# Patient Record
Sex: FEMALE | Race: WHITE
[De-identification: names, ages, dates, MRNs, and addresses within clinical notes are randomized per-mention and may not be internally consistent; named-entity substitution may affect disease eponyms.]

---

## 2017-07-28 ENCOUNTER — HOSPITAL ENCOUNTER (EMERGENCY)
Dept: HOSPITAL 10 - FTE | Age: 37
Discharge: HOME | End: 2017-07-28
Payer: COMMERCIAL

## 2017-07-28 VITALS — BODY MASS INDEX: 40.31 KG/M2 | HEIGHT: 55 IN | WEIGHT: 174.17 LBS

## 2017-07-28 DIAGNOSIS — R21: Primary | ICD-10-CM

## 2017-07-28 DIAGNOSIS — L53.9: ICD-10-CM

## 2017-07-28 PROCEDURE — 96372 THER/PROPH/DIAG INJ SC/IM: CPT

## 2017-07-28 NOTE — ERD
ER Documentation


Chief Complaint


Date/Time


DATE: 7/28/17 


TIME: 13:08


Chief Complaint


ITCHY FOLLOWING BP MEDS





HPI


36-year-old female presents to the emergency department with diffuse itching 

and erythema that started an hour after taking her blood pressure medication 

this morning.  Patient states that she takes medication for blood pressure 

usually at nighttime, she has been taking this for 4 years but does not recall 

the name at this time.  She went to work and she is out like her blood pressure 

is high she felt hot, and then she took a blood pressure medication an hour 

later she got a rash.  She does not recall taking new foods, medications or 

lotions or creams.  She has no known allergies.  She has not had any trouble 

swallowing, shortness of breath.





ROS


All systems reviewed and are negative except as per history of present illness.





Medications


Home Meds


Active Scripts


Levofloxacin* (Levaquin*) 750 Mg Tablet, 750 MG PO DAILY for 7 Days, TAB


   Prov:ISAÍAS MARIE         4/13/16


Clonidine Hcl* (Clonidine Hcl*) 0.2 Mg Tablet, 0.2 MG PO BID, #20 TAB


   Prov:HITESH DAVIS MD         1/24/16


Reported Medications


Famotidine* (Famotidine*) 20 Mg Tablet, 20 MG PO BID, TAB


   9/24/15





Allergies


Allergies:  


Coded Allergies:  


     No Known Drug Allergy (Verified  Allergy, Mild, 9/24/15)





PMhx/Soc


History of Surgery:  Yes (1X C SECTION, GALLBLADDER (LASER) SURGERY, HERNIA 

SURGERY)


Anesthesia Reaction:  No


Hx Neurological Disorder:  No


Hx Respiratory Disorders:  No


Hx Cardiac Disorders:  No


Hx Psychiatric Problems:  No


Hx Miscellaneous Medical Probl:  No


Hx Alcohol Use:  No


Hx Substance Use:  No


Hx Tobacco Use:  No





Physical Exam


Vitals





Vital Signs








  Date Time  Temp Pulse Resp B/P Pulse Ox O2 Delivery O2 Flow Rate FiO2


 


7/28/17 12:02 98.0 94 18 177/75 99   








Physical Exam


General: Well-developed, well-nourished.  The patient appears in no acute 

distress.


HEENT: Head is normocephalic, atraumatic. No scleral icterus.  


Neck: Supple.  Nontender.


Lungs: Clear to auscultation.  Normal air movement.


Heart: Regular rate and rhythm.  S1 and S2 are normal.  No murmurs, gallops, or 

rubs.


Abdomen: Soft, nontender, nondistended.  Bowel sounds are normoactive.


Extremities: No clubbing or cyanosis.  Normal pulses. Moving extremities x 4. 

No weakness.


Neurologic: Alert and oriented 3.  No focal deficits.


Skin: Blotchy, erythematous rash, patient's face, extremities and trunk are 

involved.


Results 24 hrs





 Current Medications








 Medications


  (Trade)  Dose


 Ordered  Sig/Edmund


 Route


 PRN Reason  Start Time


 Stop Time Status Last Admin


Dose Admin


 


 Diphenhydramine


 HCl


  (Benadryl)  50 mg  ONCE  ONCE


 IM


   7/28/17 13:00


 7/28/17 13:01 DC 7/28/17 12:53


 


 


 Methylprednisolone


 Sodium Succinate


  (Solu-Medrol)  125 mg  ONCE  ONCE


 IM


   7/28/17 13:00


 7/28/17 13:01 DC 7/28/17 12:53


 











Procedures/MDM


ED course:


Patient was given Solu-Medrol 125 mg IM, as well as Benadryl 50 mg IM.





MDM: 36-year-old female comes emergency room with allergic reaction, rash is 

erythematous and itchy.  It is unlikely due to her blood pressure medication as 

she has been taking this for 4 years.  She does not recall the name at this 

time.  She likely was feeling this way because of the reaction, she felt like 

she was getting hot.  Patient's allergic symptoms have stabilized while they 

have been evaluated in the department without evidence of persistent systemic 

reaction. 


Patient is healthy and capable of treating and responding to rebound reactions.


Patient appropriate for outpatient allergy work up and treatment.





Departure


Diagnosis:  


 Primary Impression:  


 Allergic reaction


Condition:  ROBIN Dao PA-C Jul 28, 2017 13:12

## 2018-07-20 ENCOUNTER — HOSPITAL ENCOUNTER (EMERGENCY)
Age: 38
Discharge: HOME | End: 2018-07-20

## 2018-07-20 ENCOUNTER — HOSPITAL ENCOUNTER (EMERGENCY)
Dept: HOSPITAL 91 - E/R | Age: 38
Discharge: HOME | End: 2018-07-20
Payer: COMMERCIAL

## 2018-07-20 DIAGNOSIS — F41.9: Primary | ICD-10-CM

## 2018-07-20 DIAGNOSIS — R40.2142: ICD-10-CM

## 2018-07-20 DIAGNOSIS — I10: ICD-10-CM

## 2018-07-20 DIAGNOSIS — N39.0: ICD-10-CM

## 2018-07-20 DIAGNOSIS — R40.2252: ICD-10-CM

## 2018-07-20 DIAGNOSIS — R40.2362: ICD-10-CM

## 2018-07-20 LAB
ADD MAN DIFF?: NO
ADD UMIC: YES
ALANINE AMINOTRANSFERASE: 44 IU/L (ref 13–69)
ALBUMIN/GLOBULIN RATIO: 1.26
ALBUMIN: 4.3 G/DL (ref 3.3–4.9)
ALKALINE PHOSPHATASE: 73 IU/L (ref 42–121)
ANION GAP: 14 (ref 8–16)
ASPARTATE AMINO TRANSFERASE: 36 IU/L (ref 15–46)
BASOPHIL #: 0 10^3/UL (ref 0–0.1)
BASOPHILS %: 0.3 % (ref 0–2)
BILIRUBIN,DIRECT: 0 MG/DL (ref 0–0.2)
BILIRUBIN,TOTAL: 0.6 MG/DL (ref 0.2–1.3)
BLOOD UREA NITROGEN: 10 MG/DL (ref 7–20)
CALCIUM: 9.5 MG/DL (ref 8.4–10.2)
CARBON DIOXIDE: 19 MMOL/L (ref 21–31)
CHLORIDE: 111 MMOL/L (ref 97–110)
CREATININE: 0.57 MG/DL (ref 0.44–1)
EOSINOPHILS #: 0 10^3/UL (ref 0–0.5)
EOSINOPHILS %: 0.3 % (ref 0–7)
GLOBULIN: 3.4 G/DL (ref 1.3–3.2)
GLUCOSE: 119 MG/DL (ref 70–220)
HEMATOCRIT: 36.7 % (ref 37–47)
HEMOGLOBIN: 12.4 G/DL (ref 12–16)
LIPASE: 28 U/L (ref 23–300)
LYMPHOCYTES #: 2 10^3/UL (ref 0.8–2.9)
LYMPHOCYTES %: 22.8 % (ref 15–51)
MEAN CORPUSCULAR HEMOGLOBIN: 28.6 PG (ref 29–33)
MEAN CORPUSCULAR HGB CONC: 33.8 G/DL (ref 32–37)
MEAN CORPUSCULAR VOLUME: 84.8 FL (ref 82–101)
MEAN PLATELET VOLUME: 10.3 FL (ref 7.4–10.4)
MONOCYTE #: 0.7 10^3/UL (ref 0.3–0.9)
MONOCYTES %: 8 % (ref 0–11)
NEUTROPHIL #: 6.1 10^3/UL (ref 1.6–7.5)
NEUTROPHILS %: 68.5 % (ref 39–77)
NUCLEATED RED BLOOD CELLS #: 0 10^3/UL (ref 0–0)
NUCLEATED RED BLOOD CELLS%: 0 /100WBC (ref 0–0)
PLATELET COUNT: 297 10^3/UL (ref 140–415)
POTASSIUM: 3.8 MMOL/L (ref 3.5–5.1)
RED BLOOD COUNT: 4.33 10^6/UL (ref 4.2–5.4)
RED CELL DISTRIBUTION WIDTH: 13.6 % (ref 11.5–14.5)
SODIUM: 140 MMOL/L (ref 135–144)
TOTAL PROTEIN: 7.7 G/DL (ref 6.1–8.1)
UR ASCORBIC ACID: NEGATIVE MG/DL
UR BACTERIA: (no result) /HPF
UR BILIRUBIN (DIP): NEGATIVE MG/DL
UR BLOOD (DIP): NEGATIVE MG/DL
UR CLARITY: CLEAR
UR COLOR: (no result)
UR GLUCOSE (DIP): NEGATIVE MG/DL
UR KETONES (DIP): (no result) MG/DL
UR LEUKOCYTE ESTERASE (DIP): (no result) LEU/UL
UR NITRITE (DIP): NEGATIVE MG/DL
UR PH (DIP): 8 (ref 5–9)
UR RBC: 1 /HPF (ref 0–5)
UR SPECIFIC GRAVITY (DIP): 1 (ref 1–1.03)
UR TOTAL PROTEIN (DIP): NEGATIVE MG/DL
UR UROBILINOGEN (DIP): NEGATIVE MG/DL
UR WBC: 14 /HPF (ref 0–5)
WHITE BLOOD COUNT: 9 10^3/UL (ref 4.8–10.8)

## 2018-07-20 PROCEDURE — 81001 URINALYSIS AUTO W/SCOPE: CPT

## 2018-07-20 PROCEDURE — 36415 COLL VENOUS BLD VENIPUNCTURE: CPT

## 2018-07-20 PROCEDURE — 96374 THER/PROPH/DIAG INJ IV PUSH: CPT

## 2018-07-20 PROCEDURE — 85025 COMPLETE CBC W/AUTO DIFF WBC: CPT

## 2018-07-20 PROCEDURE — 81025 URINE PREGNANCY TEST: CPT

## 2018-07-20 PROCEDURE — 80053 COMPREHEN METABOLIC PANEL: CPT

## 2018-07-20 PROCEDURE — 83690 ASSAY OF LIPASE: CPT

## 2018-07-20 PROCEDURE — 96361 HYDRATE IV INFUSION ADD-ON: CPT

## 2018-07-20 PROCEDURE — 99284 EMERGENCY DEPT VISIT MOD MDM: CPT

## 2018-07-20 RX ADMIN — LORAZEPAM 1 MG: 2 INJECTION, SOLUTION INTRAMUSCULAR; INTRAVENOUS at 09:08

## 2018-07-20 RX ADMIN — KETOROLAC TROMETHAMINE 1 MG: 15 INJECTION, SOLUTION INTRAMUSCULAR; INTRAVENOUS at 09:08

## 2018-07-20 RX ADMIN — CEPHALEXIN 1 MG: 500 CAPSULE ORAL at 10:24

## 2018-07-20 RX ADMIN — THIAMINE HYDROCHLORIDE 1 MLS/HR: 100 INJECTION, SOLUTION INTRAMUSCULAR; INTRAVENOUS at 09:07

## 2018-12-14 ENCOUNTER — HOSPITAL ENCOUNTER (EMERGENCY)
Dept: HOSPITAL 91 - E/R | Age: 38
Discharge: HOME | End: 2018-12-14
Payer: COMMERCIAL

## 2018-12-14 ENCOUNTER — HOSPITAL ENCOUNTER (EMERGENCY)
Age: 38
Discharge: HOME | End: 2018-12-14

## 2018-12-14 DIAGNOSIS — I10: ICD-10-CM

## 2018-12-14 DIAGNOSIS — G89.18: Primary | ICD-10-CM

## 2018-12-14 LAB
ADD MAN DIFF?: NO
ADD UMIC: YES
ALANINE AMINOTRANSFERASE: 59 IU/L (ref 13–69)
ALBUMIN/GLOBULIN RATIO: 1.2
ALBUMIN: 4.2 G/DL (ref 3.3–4.9)
ALKALINE PHOSPHATASE: 79 IU/L (ref 42–121)
ANION GAP: 13 (ref 5–13)
ASPARTATE AMINO TRANSFERASE: 45 IU/L (ref 15–46)
BASOPHIL #: 0 10^3/UL (ref 0–0.1)
BASOPHILS %: 0.3 % (ref 0–2)
BILIRUBIN,DIRECT: 0 MG/DL (ref 0–0.2)
BILIRUBIN,TOTAL: 0.5 MG/DL (ref 0.2–1.3)
BLOOD UREA NITROGEN: 8 MG/DL (ref 7–20)
CALCIUM: 9.7 MG/DL (ref 8.4–10.2)
CARBON DIOXIDE: 29 MMOL/L (ref 21–31)
CHLORIDE: 102 MMOL/L (ref 97–110)
CREATININE: 0.58 MG/DL (ref 0.44–1)
EOSINOPHILS #: 0.1 10^3/UL (ref 0–0.5)
EOSINOPHILS %: 0.9 % (ref 0–7)
GLOBULIN: 3.5 G/DL (ref 1.3–3.2)
GLUCOSE: 102 MG/DL (ref 70–220)
HEMATOCRIT: 39.2 % (ref 37–47)
HEMOGLOBIN: 13.1 G/DL (ref 12–16)
IMMATURE GRANS #M: 0.01 10^3/UL (ref 0–0.03)
IMMATURE GRANS % (M): 0.1 % (ref 0–0.43)
LIPASE: 36 U/L (ref 23–300)
LYMPHOCYTES #: 2.5 10^3/UL (ref 0.8–2.9)
LYMPHOCYTES %: 28.4 % (ref 15–51)
MEAN CORPUSCULAR HEMOGLOBIN: 28.5 PG (ref 29–33)
MEAN CORPUSCULAR HGB CONC: 33.4 G/DL (ref 32–37)
MEAN CORPUSCULAR VOLUME: 85.4 FL (ref 82–101)
MEAN PLATELET VOLUME: 10.5 FL (ref 7.4–10.4)
MONOCYTE #: 0.8 10^3/UL (ref 0.3–0.9)
MONOCYTES %: 9 % (ref 0–11)
NEUTROPHIL #: 5.4 10^3/UL (ref 1.6–7.5)
NEUTROPHILS %: 61.3 % (ref 39–77)
NUCLEATED RED BLOOD CELLS #: 0 10^3/UL (ref 0–0)
NUCLEATED RED BLOOD CELLS%: 0 /100WBC (ref 0–0)
PLATELET COUNT: 316 10^3/UL (ref 140–415)
POTASSIUM: 3.7 MMOL/L (ref 3.5–5.1)
RED BLOOD COUNT: 4.59 10^6/UL (ref 4.2–5.4)
RED CELL DISTRIBUTION WIDTH: 12.4 % (ref 11.5–14.5)
SODIUM: 144 MMOL/L (ref 135–144)
TOTAL PROTEIN: 7.7 G/DL (ref 6.1–8.1)
UR ASCORBIC ACID: NEGATIVE MG/DL
UR BILIRUBIN (DIP): NEGATIVE MG/DL
UR BLOOD (DIP): (no result) MG/DL
UR CLARITY: CLEAR
UR COLOR: COLORLESS
UR GLUCOSE (DIP): NEGATIVE MG/DL
UR KETONES (DIP): NEGATIVE MG/DL
UR LEUKOCYTE ESTERASE (DIP): NEGATIVE LEU/UL
UR NITRITE (DIP): NEGATIVE MG/DL
UR PH (DIP): 6 (ref 5–9)
UR RBC: 0 /HPF (ref 0–5)
UR SPECIFIC GRAVITY (DIP): 1 (ref 1–1.03)
UR TOTAL PROTEIN (DIP): NEGATIVE MG/DL
UR UROBILINOGEN (DIP): NEGATIVE MG/DL
UR WBC: 0 /HPF (ref 0–5)
WHITE BLOOD COUNT: 8.9 10^3/UL (ref 4.8–10.8)

## 2018-12-14 PROCEDURE — 83690 ASSAY OF LIPASE: CPT

## 2018-12-14 PROCEDURE — 96375 TX/PRO/DX INJ NEW DRUG ADDON: CPT

## 2018-12-14 PROCEDURE — 81001 URINALYSIS AUTO W/SCOPE: CPT

## 2018-12-14 PROCEDURE — 36415 COLL VENOUS BLD VENIPUNCTURE: CPT

## 2018-12-14 PROCEDURE — 85025 COMPLETE CBC W/AUTO DIFF WBC: CPT

## 2018-12-14 PROCEDURE — 74176 CT ABD & PELVIS W/O CONTRAST: CPT

## 2018-12-14 PROCEDURE — 96374 THER/PROPH/DIAG INJ IV PUSH: CPT

## 2018-12-14 PROCEDURE — 80053 COMPREHEN METABOLIC PANEL: CPT

## 2018-12-14 PROCEDURE — 99285 EMERGENCY DEPT VISIT HI MDM: CPT

## 2018-12-14 RX ADMIN — ONDANSETRON HYDROCHLORIDE 1 MG: 2 INJECTION, SOLUTION INTRAMUSCULAR; INTRAVENOUS at 06:46

## 2019-04-03 ENCOUNTER — HOSPITAL ENCOUNTER (EMERGENCY)
Dept: HOSPITAL 10 - FTE | Age: 39
Discharge: HOME | End: 2019-04-03
Payer: COMMERCIAL

## 2019-04-03 ENCOUNTER — HOSPITAL ENCOUNTER (EMERGENCY)
Dept: HOSPITAL 91 - FTE | Age: 39
Discharge: HOME | End: 2019-04-03
Payer: COMMERCIAL

## 2019-04-03 VITALS
BODY MASS INDEX: 31.72 KG/M2 | BODY MASS INDEX: 31.72 KG/M2 | WEIGHT: 179.02 LBS | WEIGHT: 179.02 LBS | HEIGHT: 63 IN | HEIGHT: 63 IN

## 2019-04-03 VITALS — SYSTOLIC BLOOD PRESSURE: 149 MMHG | HEART RATE: 79 BPM | DIASTOLIC BLOOD PRESSURE: 93 MMHG

## 2019-04-03 VITALS — RESPIRATION RATE: 18 BRPM

## 2019-04-03 DIAGNOSIS — I10: ICD-10-CM

## 2019-04-03 DIAGNOSIS — N39.0: Primary | ICD-10-CM

## 2019-04-03 LAB
ADD MAN DIFF?: NO
ADD UMIC: YES
ALANINE AMINOTRANSFERASE: 65 IU/L (ref 13–69)
ALBUMIN/GLOBULIN RATIO: 1.22
ALBUMIN: 4.3 G/DL (ref 3.3–4.9)
ALKALINE PHOSPHATASE: 90 IU/L (ref 42–121)
ANION GAP: 10 (ref 5–13)
ASPARTATE AMINO TRANSFERASE: 53 IU/L (ref 15–46)
BASOPHIL #: 0 10^3/UL (ref 0–0.1)
BASOPHILS %: 0.5 % (ref 0–2)
BILIRUBIN,DIRECT: 0 MG/DL (ref 0–0.2)
BILIRUBIN,TOTAL: 0.1 MG/DL (ref 0.2–1.3)
BLOOD UREA NITROGEN: 11 MG/DL (ref 7–20)
CALCIUM: 9.4 MG/DL (ref 8.4–10.2)
CARBON DIOXIDE: 28 MMOL/L (ref 21–31)
CHLORIDE: 102 MMOL/L (ref 97–110)
CREATININE: 0.61 MG/DL (ref 0.44–1)
EOSINOPHILS #: 0.1 10^3/UL (ref 0–0.5)
EOSINOPHILS %: 1.1 % (ref 0–7)
GLOBULIN: 3.5 G/DL (ref 1.3–3.2)
GLUCOSE: 101 MG/DL (ref 70–220)
HEMATOCRIT: 36.9 % (ref 37–47)
HEMOGLOBIN: 12 G/DL (ref 12–16)
IMMATURE GRANS #M: 0.03 10^3/UL (ref 0–0.03)
IMMATURE GRANS % (M): 0.4 % (ref 0–0.43)
LIPASE: 30 U/L (ref 23–300)
LYMPHOCYTES #: 2.3 10^3/UL (ref 0.8–2.9)
LYMPHOCYTES %: 28.4 % (ref 15–51)
MEAN CORPUSCULAR HEMOGLOBIN: 28.4 PG (ref 29–33)
MEAN CORPUSCULAR HGB CONC: 32.5 G/DL (ref 32–37)
MEAN CORPUSCULAR VOLUME: 87.2 FL (ref 82–101)
MEAN PLATELET VOLUME: 10 FL (ref 7.4–10.4)
MONOCYTE #: 0.9 10^3/UL (ref 0.3–0.9)
MONOCYTES %: 11.2 % (ref 0–11)
NEUTROPHIL #: 4.8 10^3/UL (ref 1.6–7.5)
NEUTROPHILS %: 58.4 % (ref 39–77)
NUCLEATED RED BLOOD CELLS #: 0 10^3/UL (ref 0–0)
NUCLEATED RED BLOOD CELLS%: 0 /100WBC (ref 0–0)
PLATELET COUNT: 288 10^3/UL (ref 140–415)
POTASSIUM: 4.3 MMOL/L (ref 3.5–5.1)
RED BLOOD COUNT: 4.23 10^6/UL (ref 4.2–5.4)
RED CELL DISTRIBUTION WIDTH: 12.7 % (ref 11.5–14.5)
SODIUM: 140 MMOL/L (ref 135–144)
TOTAL PROTEIN: 7.8 G/DL (ref 6.1–8.1)
UR ASCORBIC ACID: NEGATIVE MG/DL
UR BILIRUBIN (DIP): NEGATIVE MG/DL
UR BLOOD (DIP): NEGATIVE MG/DL
UR CLARITY: (no result)
UR COLOR: (no result)
UR GLUCOSE (DIP): NEGATIVE MG/DL
UR KETONES (DIP): NEGATIVE MG/DL
UR LEUKOCYTE ESTERASE (DIP): (no result) LEU/UL
UR NITRITE (DIP): NEGATIVE MG/DL
UR PH (DIP): 7 (ref 5–9)
UR RBC: 2 /HPF (ref 0–5)
UR SPECIFIC GRAVITY (DIP): 1.01 (ref 1–1.03)
UR SQUAMOUS EPITHELIAL CELL: (no result) /HPF
UR TOTAL PROTEIN (DIP): NEGATIVE MG/DL
UR UROBILINOGEN (DIP): NEGATIVE MG/DL
UR WBC: 4 /HPF (ref 0–5)
WHITE BLOOD COUNT: 8.1 10^3/UL (ref 4.8–10.8)

## 2019-04-03 PROCEDURE — 76705 ECHO EXAM OF ABDOMEN: CPT

## 2019-04-03 PROCEDURE — 83690 ASSAY OF LIPASE: CPT

## 2019-04-03 PROCEDURE — 81001 URINALYSIS AUTO W/SCOPE: CPT

## 2019-04-03 PROCEDURE — 36415 COLL VENOUS BLD VENIPUNCTURE: CPT

## 2019-04-03 PROCEDURE — 85025 COMPLETE CBC W/AUTO DIFF WBC: CPT

## 2019-04-03 PROCEDURE — 87086 URINE CULTURE/COLONY COUNT: CPT

## 2019-04-03 PROCEDURE — 99284 EMERGENCY DEPT VISIT MOD MDM: CPT

## 2019-04-03 PROCEDURE — 80053 COMPREHEN METABOLIC PANEL: CPT

## 2019-04-03 PROCEDURE — 84703 CHORIONIC GONADOTROPIN ASSAY: CPT

## 2019-04-03 RX ADMIN — ALUMINUM HYDROXIDE, MAGNESIUM HYDROXIDE, DIMETHICONE 1 ML: 200; 200; 20 SUSPENSION ORAL at 16:56

## 2019-04-03 RX ADMIN — FAMOTIDINE 1 MG: 20 TABLET ORAL at 16:56

## 2019-04-03 NOTE — ERD
ER Documentation


Chief Complaint


Chief Complaint





AP RADIATING TO LOWER BACK





HPI


This is a 38-year-old female with a history of heartburn and hypertension who 


presents ED with complaints of epigastric abdominal pain that started yesterday 


while eating spicy food.  Patient states that the pain is constant and she rates


it at a 7 out of 10 pain.  Patient states that the pain is alleviated when she 


is sitting still and worse with movement.  Patient has a history of having her 


gallbladder removed 19 years ago.  Patient states that she had a stent placed on


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  


Patient states that she has had similar epigastric pain in the past.  Patient 


was seen at [facility] on 2019 and had a full work-up and 


everything was unremarkable.  Denies fever, chills, nausea, vomiting, diarrhea, 


constipation, hematemesis, hemoptysis, cough, congestion, chest pain, shortness 


breath, jaw pain, left arm pain, melena, hematochezia and all other symptoms.  


No known drug allergies.





ROS


All systems reviewed and are negative except as per history of present illness.





Medications


Home Meds


Active Scripts


Cephalexin* (Keflex*) 500 Mg Capsule, 500 MG PO QID for 10 Days, CAP


   Prov:IVONNE SUNG PA-C         4/3/19


Famotidine* (Pepcid*) 20 Mg Tablet, 20 MG PO BID for 4 Days, TAB


   Prov:IVONNE SUNG PA-C         4/3/19


Ondansetron (Ondansetron Odt) 4 Mg Tab.rapdis, 4 MG PO Q6H PRN for NAUSEA AND/OR


VOMITING, #10 TAB


   Prov:JOSEE CASTRO MD         18


Hydrocodone/Acetaminophen (Norco 5-325 Tablet) 1 Each Tablet, 1 TAB PO Q6H PRN 


for PAIN, #7 TAB


   Prov:JOSEE CASTRO MD         18


Reported Medications


Hydrocodone/Acetaminophen (Norco 5-325 Tablet) 1 Each Tablet, 1 EACH PO, TAB


   18


Omeprazole* (Omeprazole*) 20 Mg Capsule.dr, 20 MG PO DAILY, #30 CAP


   18


Losartan Potassium* (Losartan Potassium*) 25 Mg Tablet, 25 MG PO DAILY, TAB


   18





Allergies


Allergies:  


Coded Allergies:  


     No Known Drug Allergy (Unverified  Allergy, Mild, 18)





PMhx/Soc


History of Surgery:  Yes (C SECTION, GALLBLADDER, HERNIA SURGERY, TUBAL 


LIGATION)


Anesthesia Reaction:  No


Hx Neurological Disorder:  No


Hx Respiratory Disorders:  No


Hx Cardiac Disorders:  Yes (HTN)


Hx Psychiatric Problems:  No


Hx Miscellaneous Medical Probl:  Yes (BILIARY PAPILLARY STENOSIS)


Hx Alcohol Use:  No


Hx Substance Use:  No


Hx Tobacco Use:  No


Smoking Status:  Never smoker





FmHx


Family History:  No diabetes





Physical Exam


Vitals





Vital Signs


  Date      Temp  Pulse  Resp  B/P (MAP)   Pulse Ox  O2          O2 Flow    FiO2


Time                                                 Delivery    Rate


    4/3/19  98.5     76    18      158/77        98


     14:38                          (104)





Physical Exam


Physical Exam


Vitals signs: Reviewed by me.


General: Well developed, well nourished, in no acute distress. Patient is awake 


and alert.


Head: Normocephalic, atraumatic.


Eyes: Normal conjunctiva, Pupils PERRLA, EOM intact grossly


ENT: Pharynx is clear, Moist mucous membranes, external ears, nose and mouth 


normal


Neck: Supple, no masses, lymphadenopathy or JVD


Respiratory: Clear to auscultation bilaterally with no wheezing, rhonchi, rales,


no distress


Cardiovascular: RRR, no murmurs, rubs, or gallops


Abdominal: Soft, nondistended, no peritoneal signs, no rigidity, no surgical 


abdomen, bowel sounds present all 4 quadrants, nontender lengthy palpation all 4


quadrants, McBurney's point nontender, no rebound tenderness, Delgado sign 


negative


Back: No midline tenderness. No flank tenderness


Neurologic: Alert and oriented, moving all extremities, normal speech, no focal 


weakness, no cerebellar signs. Normal mentation


Skin: warm and dry, No rash


Psych: Normal mood


Result Diagram:  


4/3/19 1543                                                                     


          4/3/19 1543





Results 24 hrs





Laboratory Tests


       Test
                                4/3/19
15:36     4/3/19
15:43


       Urine Pregnancy Test                 NEGATIVE


       White Blood Count                                     8.1 10^3/ul


       Red Blood Count                                      4.23 10^6/ul


       Hemoglobin                                              12.0 g/dl


       Hematocrit                                                 36.9 %


       Mean Corpuscular Volume                                   87.2 fl


       Mean Corpuscular Hemoglobin                               28.4 pg


       Mean Corpuscular Hemoglobin
Concent  
                 32.5 g/dl 



       Red Cell Distribution Width                                12.7 %


       Platelet Count                                        288 10^3/UL


       Mean Platelet Volume                                      10.0 fl


       Immature Granulocytes %                                   0.400 %


       Neutrophils %                                              58.4 %


       Lymphocytes %                                              28.4 %


       Monocytes %                                                11.2 %


       Eosinophils %                                               1.1 %


       Basophils %                                                 0.5 %


       Nucleated Red Blood Cells %                           0.0 /100WBC


       Immature Granulocytes #                             0.030 10^3/ul


       Neutrophils #                                         4.8 10^3/ul


       Lymphocytes #                                         2.3 10^3/ul


       Monocytes #                                           0.9 10^3/ul


       Eosinophils #                                         0.1 10^3/ul


       Basophils #                                           0.0 10^3/ul


       Nucleated Red Blood Cells #                           0.0 10^3/ul


       Urine Color                                        GAUTAM


       Urine Clarity                                      CLOUDY


       Urine pH                                                      7.0


       Urine Specific Gravity                                      1.010


       Urine Ketones                                      NEGATIVE mg/dL


       Urine Nitrite                                      NEGATIVE mg/dL


       Urine Bilirubin                                    NEGATIVE mg/dL


       Urine Urobilinogen                                 NEGATIVE mg/dL


       Urine Leukocyte Esterase                                2+ Delaney/ul


       Urine Microscopic RBC                                      2 /HPF


       Urine Microscopic WBC                                      4 /HPF


       Urine Squamous Epithelial
Cells      
             FEW /HPF 



       Urine Hemoglobin                                   NEGATIVE mg/dL


       Urine Glucose                                      NEGATIVE mg/dL


       Urine Total Protein                                NEGATIVE mg/dl


       Sodium Level                                           140 mmol/L


       Potassium Level                                        4.3 mmol/L


       Chloride Level                                         102 mmol/L


       Carbon Dioxide Level                                    28 mmol/L


       Anion Gap                                                      10


       Blood Urea Nitrogen                                      11 mg/dl


       Creatinine                                             0.61 mg/dl


       Est Glomerular Filtrat Rate
mL/min   
             > 60 mL/min 



       Glucose Level                                           101 mg/dl


       Calcium Level                                           9.4 mg/dl


       Total Bilirubin                                         0.1 mg/dl


       Direct Bilirubin                                       0.00 mg/dl


       Indirect Bilirubin                                      0.1 mg/dl


       Aspartate Amino Transf
(AST/SGOT)    
                   53 IU/L 



       Alanine Aminotransferase
(ALT/SGPT)  
                   65 IU/L 



       Alkaline Phosphatase                                      90 IU/L


       Total Protein                                            7.8 g/dl


       Albumin                                                  4.3 g/dl


       Globulin                                                3.50 g/dl


       Albumin/Globulin Ratio                                       1.22


       Lipase                                                     30 U/L





Current Medications


 Medications
   Dose
          Sig/Edmund
       Start Time
   Status  Last


 (Trade)       Ordered        Route
 PRN     Stop Time              Admin
Dose


                              Reason                                Admin


 Famotidine
    20 mg          ONCE  STAT
    4/3/19        DC            4/3/19


(Pepcid)                      PO
            16:48
 4/3/19                16:56



                                             16:49


                40 ml          ONCE  STAT
    4/3/19        DC            4/3/19


Miscellaneous                 PO
            16:48
 4/3/19                16:56




 Medication
                                16:49


 (Gi Cocktail


(2))








Procedures/MDM


EKG, MONITORS, & DIAGNOSTIC IMAGING:


                          [place] [address]


                        Radiology Main Line: [phone]





                            DIAGNOSTIC IMAGING REPORT





Patient: JHONNY ORDOÑEZ   : 1980   Age: 38  Sex: F                     


  


       MR #:    [id number]   Acct #:   [id number]    DOS: 19 1528


Ordering MD: LAYO ROBISON PA-C   Location:  [facility]   Room/Bed:            


                               


                                        


PROCEDURE:   US Abdomen (right upper quadrant). 


 


CLINICAL INDICATION:    Abdominal pain.


 


TECHNIQUE:   Multiple real-time longitudinal and transverse images of the right 


upper quadrant of the abdomen were acquired utilizing a curved array transducer.


Images were reviewed on a high-resolution PACS workstation. 


 


COMPARISON:   CT 2018 


 


FINDINGS:


 


The liver is normal in size and demonstrates increased echogenicity.  No focal 


intrahepatic mass is identified.  The gallbladder is surgically absent.  No 


intra or extrahepatic biliary dilatation is seen.  The common bile duct measures


8.8 mm in maximal dimension. A common bile duct stent is in place. The portal 


and hepatic veins are patent demonstrating normal directional flow. The 


visualized portions of the pancreas are unremarkable with obscuration of the 


tail of the pancreas.  No free fluid is identified. 


 


The right kidney measures 11.6 cm in length.  The renal parenchyma demonstrates 


normal echogenicity.  There is no perinephric fluid collection.  No 


hydronephrosis, mass, or calculus is seen.   


 


IMPRESSION:


 


1.  Status post cholecystectomy.


2.  Common bile duct stent in place.


3.  Hepatic steatosis. 


 


  


RPTAT: HH


_____________________________________________ 


.Elizabeth Rocha MD, MD           Date    Time 


Electronically viewed and signed by .Elizabeth Rocha MD, MD on 2019 


17:26 


 


D:  2019 17:26  T:  2019 17:26


.G/





CC: DONELL ROBISON PA-C





[id number]








LAB INTERPRETATION:


CBC shows no evidence of hemorrhage or infection


Chemistry shows no evidence of significant electrolyte abnormalities or renal 


insufficiency


Liver function test shows no evidence of acute biliary or hepatic dysfunction


Lipase shows no evidence of acute pancreatitis


Urine remarkable for 4 WBCs, 2 RBCs, 2+ leukocyte esterase 





ER COURSE:


The patient was given GI cocktail, famotidine


The medication was well tolerated and the patient reports improvement in 


symptoms.  


The patient was stable throughout ED course.


I kept the patient and/or family informed of laboratory and diagnostic imaging 


results throughout the emergency room course.





The patient was promptly evaluated and a treatment plan was devised based on H&P


and other data. This plan was discussed with the patient who agreed and had no 


further questions or concerns prior to discharge.





MEDICAL DECISION MAKING:


This is a 38-year-old female with a history of hypertension who presents ED with


epigastric abdominal pain since yesterday.  Patient has had bouts of similar 


pain over the past 2 months.  Patient has a history of a cholecystectomy 19 


years ago and had a stent placed for biliary tree stenosis in 2018.  


Pregnancy test is negative. Considered causes of female-specific abdominal pain 


including pelvic inflammatory disease, tubo-ovarian abscess, Ekez-Moty-Zseccc, 


and ovarian torsion.  also considered causes of abdominal pain that are not 


gender-specific (e.g., appendicitis, volvulus, small bowel obstruction, 


mesenteric adenitis, acute cholecystitis/choledocholithiasis and other biliary 


pathology, etc.).  Given the location of pain doubt gynecologic etiology.  


Patient well-appearing with normal vital signs. No peritoneal signs and abdomen 


benign on multiple repeat examinations. Pt well hydrated.  Urinalysis shows WBCs


as well as leukocyte esterase.  Will treat patient for UTI. otherwise Laboratory


testing and imaging here reviewed and normal.  This is likely gastritis or 


gastroenteritis.  Patient advised to follow-up with GI specialist to rule out 


PUD among other diseases. patient given strict return precautions for worsening 


pain, inability to eat/drink, fevers (temperature over 100.4F), or other concern


s. Prior to discharge all questions answered. She agrees with treatment plan and


understands strict return precautions. Follow-up for repeat abdominal exam 


within 12 hours.


 





DISPOSITION PLAN:


We discussed follow up with the patient's primary care doctor within 24 to 48 


hours.  Patient counseled regarding my diagnostic impression and care plan. 


Prior to discharge all questions answered. Pt agrees with treatment plan and 


understands strict return precautions. Precautionary instructions provided 


including instructions to return to the ER if not improving or for any worsening


or changing symptoms or concerns.





SPECIALIST FOLLOW UP RECOMMENDED: GI


Patient has been advised to follow up with primary care in 1-2 days.





Disclaimer: Inadvertent spelling and grammatical errors are likely due to EHR


/dictation software use and do not reflect on the overall quality of patient 


care. Also, please note that the electronic time recorded on this note does not 


necessarily reflect the actual time of the patient encounter.





Blood Pressure Assessment: Patient's blood pressure was elevated (>120/80) but a


ppears stable without evidence of hypertension emergency or urgency.  The 


patient was counseled about the risks of hypertension and urged to pursue 


outpatient monitoring and therapy within a week with their primary care 


physician.





Departure


Diagnosis:  


   Primary Impression:  


   Epigastric abdominal pain


   Additional Impression:  


   UTI (urinary tract infection)


   Urinary tract infection type:  site unspecified  Hematuria presence:  without


   hematuria  Qualified Codes:  N39.0 - Urinary tract infection, site not 


   specified


Condition:  Stable


Patient Instructions:  Epigastric Pain (Uncertain Cause), Gastritis (Adult), Gas


tritis Vs. Ulcer, Gastroesophageal Reflux Disease (GERD), Understanding Urinary 


Tract Infections (UTIs)


Referrals:  


DINORA JENNINGS MD, NAGARAJ M MD DESIGAN, GNANA MD GORDON, RICHARD K MD JOGANI, PIYUSH K MD





[facility]  ()





Additional Instructions:  


Paciente aconseja volver a Departamento de urgencias inmediatamente para 


sntomas nuevos o que empeoran . Paciente aconseja posteriores con el PCP en 1-2


lerner . Paciente verbaliza la comprehensin y est de acuerdo con el tratamiento 


y el curso de accin.





Si el paciente no tiene ninguna de atencin primaria pueden seguir con





[facility]


[address] [place] [address]





o





[facility] + [address] [address]











IVONNE SUNG PA-C           Apr 3, 2019 16:48

## 2019-06-11 ENCOUNTER — HOSPITAL ENCOUNTER (EMERGENCY)
Dept: HOSPITAL 10 - FTE | Age: 39
Discharge: HOME | End: 2019-06-11
Payer: COMMERCIAL

## 2019-06-11 ENCOUNTER — HOSPITAL ENCOUNTER (EMERGENCY)
Dept: HOSPITAL 91 - FTE | Age: 39
Discharge: HOME | End: 2019-06-11
Payer: COMMERCIAL

## 2019-06-11 VITALS — HEIGHT: 55 IN | WEIGHT: 181.44 LBS | BODY MASS INDEX: 41.99 KG/M2

## 2019-06-11 VITALS — RESPIRATION RATE: 16 BRPM | HEART RATE: 85 BPM | DIASTOLIC BLOOD PRESSURE: 77 MMHG | SYSTOLIC BLOOD PRESSURE: 120 MMHG

## 2019-06-11 DIAGNOSIS — J98.11: Primary | ICD-10-CM

## 2019-06-11 DIAGNOSIS — R10.9: ICD-10-CM

## 2019-06-11 LAB
ADD MAN DIFF?: NO
ADD UMIC: YES
ALANINE AMINOTRANSFERASE: 129 IU/L (ref 13–69)
ALBUMIN/GLOBULIN RATIO: 1.05
ALBUMIN: 3.6 G/DL (ref 3.3–4.9)
ALKALINE PHOSPHATASE: 173 IU/L (ref 42–121)
ANION GAP: 8 (ref 5–13)
ASPARTATE AMINO TRANSFERASE: 91 IU/L (ref 15–46)
BASOPHIL #: 0 10^3/UL (ref 0–0.1)
BASOPHILS %: 0.2 % (ref 0–2)
BILIRUBIN,DIRECT: 0 MG/DL (ref 0–0.2)
BILIRUBIN,TOTAL: 1 MG/DL (ref 0.2–1.3)
BLOOD UREA NITROGEN: 10 MG/DL (ref 7–20)
CALCIUM: 8.9 MG/DL (ref 8.4–10.2)
CARBON DIOXIDE: 24 MMOL/L (ref 21–31)
CHLORIDE: 106 MMOL/L (ref 97–110)
CREATININE: 0.69 MG/DL (ref 0.44–1)
EOSINOPHILS #: 0 10^3/UL (ref 0–0.5)
EOSINOPHILS %: 0.1 % (ref 0–7)
GLOBULIN: 3.4 G/DL (ref 1.3–3.2)
GLUCOSE: 135 MG/DL (ref 70–220)
HEMATOCRIT: 33.8 % (ref 37–47)
HEMOGLOBIN: 11.5 G/DL (ref 12–16)
IMMATURE GRANS #M: 0.13 10^3/UL (ref 0–0.03)
IMMATURE GRANS % (M): 0.8 % (ref 0–0.43)
INR: 1.44
LACTIC ACID: 1 MMOL/L (ref 0.5–2)
LIPASE: 23 U/L (ref 23–300)
LYMPHOCYTES #: 0.9 10^3/UL (ref 0.8–2.9)
LYMPHOCYTES %: 5.1 % (ref 15–51)
MEAN CORPUSCULAR HEMOGLOBIN: 28.3 PG (ref 29–33)
MEAN CORPUSCULAR HGB CONC: 34 G/DL (ref 32–37)
MEAN CORPUSCULAR VOLUME: 83 FL (ref 82–101)
MEAN PLATELET VOLUME: 10.2 FL (ref 7.4–10.4)
MONOCYTE #: 0.6 10^3/UL (ref 0.3–0.9)
MONOCYTES %: 3.3 % (ref 0–11)
NEUTROPHIL #: 15.4 10^3/UL (ref 1.6–7.5)
NEUTROPHILS %: 90.5 % (ref 39–77)
NUCLEATED RED BLOOD CELLS #: 0 10^3/UL (ref 0–0)
NUCLEATED RED BLOOD CELLS%: 0 /100WBC (ref 0–0)
PARTIAL THROMBOPLASTIN TIME: 33.7 SEC (ref 23–35)
PLATELET COUNT: 235 10^3/UL (ref 140–415)
POTASSIUM: 3.1 MMOL/L (ref 3.5–5.1)
PROTIME: 17.6 SEC (ref 11.9–14.9)
PT RATIO: 1.4
RED BLOOD COUNT: 4.07 10^6/UL (ref 4.2–5.4)
RED CELL DISTRIBUTION WIDTH: 13.5 % (ref 11.5–14.5)
SODIUM: 138 MMOL/L (ref 135–144)
TOTAL PROTEIN: 7 G/DL (ref 6.1–8.1)
TROPONIN-I: < 0.012 NG/ML (ref 0–0.12)
UR ASCORBIC ACID: NEGATIVE MG/DL
UR BILIRUBIN (DIP): (no result) MG/DL
UR BLOOD (DIP): NEGATIVE MG/DL
UR CLARITY: CLEAR
UR COLOR: (no result)
UR GLUCOSE (DIP): NEGATIVE MG/DL
UR KETONES (DIP): (no result) MG/DL
UR LEUKOCYTE ESTERASE (DIP): NEGATIVE LEU/UL
UR NITRITE (DIP): NEGATIVE MG/DL
UR PH (DIP): 7 (ref 5–9)
UR RBC: 5 /HPF (ref 0–5)
UR SPECIFIC GRAVITY (DIP): 1.03 (ref 1–1.03)
UR SQUAMOUS EPITHELIAL CELL: (no result) /HPF
UR TOTAL PROTEIN (DIP): (no result) MG/DL
UR UROBILINOGEN (DIP): (no result) MG/DL
UR WBC: 5 /HPF (ref 0–5)
WHITE BLOOD COUNT: 17 10^3/UL (ref 4.8–10.8)

## 2019-06-11 PROCEDURE — 76705 ECHO EXAM OF ABDOMEN: CPT

## 2019-06-11 PROCEDURE — 96361 HYDRATE IV INFUSION ADD-ON: CPT

## 2019-06-11 PROCEDURE — 83690 ASSAY OF LIPASE: CPT

## 2019-06-11 PROCEDURE — 84484 ASSAY OF TROPONIN QUANT: CPT

## 2019-06-11 PROCEDURE — 99285 EMERGENCY DEPT VISIT HI MDM: CPT

## 2019-06-11 PROCEDURE — 74176 CT ABD & PELVIS W/O CONTRAST: CPT

## 2019-06-11 PROCEDURE — 85610 PROTHROMBIN TIME: CPT

## 2019-06-11 PROCEDURE — 87086 URINE CULTURE/COLONY COUNT: CPT

## 2019-06-11 PROCEDURE — 71045 X-RAY EXAM CHEST 1 VIEW: CPT

## 2019-06-11 PROCEDURE — 87040 BLOOD CULTURE FOR BACTERIA: CPT

## 2019-06-11 PROCEDURE — 81001 URINALYSIS AUTO W/SCOPE: CPT

## 2019-06-11 PROCEDURE — 96375 TX/PRO/DX INJ NEW DRUG ADDON: CPT

## 2019-06-11 PROCEDURE — 85025 COMPLETE CBC W/AUTO DIFF WBC: CPT

## 2019-06-11 PROCEDURE — 83605 ASSAY OF LACTIC ACID: CPT

## 2019-06-11 PROCEDURE — 85730 THROMBOPLASTIN TIME PARTIAL: CPT

## 2019-06-11 PROCEDURE — 36415 COLL VENOUS BLD VENIPUNCTURE: CPT

## 2019-06-11 PROCEDURE — 96374 THER/PROPH/DIAG INJ IV PUSH: CPT

## 2019-06-11 PROCEDURE — 80053 COMPREHEN METABOLIC PANEL: CPT

## 2019-06-11 PROCEDURE — 81025 URINE PREGNANCY TEST: CPT

## 2019-06-11 RX ADMIN — THIAMINE HYDROCHLORIDE 1 MLS/HR: 100 INJECTION, SOLUTION INTRAMUSCULAR; INTRAVENOUS at 16:59

## 2019-06-11 RX ADMIN — ACETAMINOPHEN 1 MG: 500 TABLET, FILM COATED ORAL at 16:59

## 2019-06-11 RX ADMIN — POTASSIUM CHLORIDE 1 MEQ: 1500 TABLET, EXTENDED RELEASE ORAL at 18:44

## 2019-06-11 RX ADMIN — ONDANSETRON HYDROCHLORIDE 1 MG: 2 INJECTION, SOLUTION INTRAMUSCULAR; INTRAVENOUS at 17:45

## 2019-06-11 RX ADMIN — THIAMINE HYDROCHLORIDE 1 ML: 100 INJECTION, SOLUTION INTRAMUSCULAR; INTRAVENOUS at 17:39

## 2019-06-11 NOTE — ERD
ER Documentation


Chief Complaint


Chief Complaint





BODY PAIN, CHILLS INTERMITTENT SINCE THURSDAY





HPI


Patient is a 38 years old female presenting to the clinic for diffuse bodyaches,


fever, chills, hyperhidrosis since Yesterday. Patient admits to recent stent 


extraction from pancreas yesterday with EGD. Patient admits to taking OTC flu 


medication without resolution. Patient denies all other ROS.





ROS


All systems reviewed and are negative except as per history of present illness.





Medications


Home Meds


Active Scripts


Ibuprofen* (Motrin*) 800 Mg Tab, 800 MG PO Q6, #30 TAB


   Prov:GURINDER COLE PA-C         6/11/19


Azithromycin* (Zithromax*) 500 Mg Tablet, 500 MG PO DAILY for 3 Days, TAB


   Prov:GURINDER COLE PA-C         6/11/19


Cephalexin* (Keflex*) 500 Mg Capsule, 500 MG PO QID for 10 Days, CAP


   Prov:IVONNE SUNG PA-C         4/3/19


Famotidine* (Pepcid*) 20 Mg Tablet, 20 MG PO BID for 4 Days, TAB


   Prov:IVONNE SUNG PA-C         4/3/19


Ondansetron (Ondansetron Odt) 4 Mg Tab.rapdis, 4 MG PO Q6H PRN for NAUSEA AND/OR


VOMITING, #10 TAB


   Prov:JOSEE CASTRO MD         12/14/18


Hydrocodone/Acetaminophen (Norco 5-325 Tablet) 1 Each Tablet, 1 TAB PO Q6H PRN 


for PAIN, #7 TAB


   Prov:JOSEE CASTRO MD         12/14/18


Reported Medications


Hydrocodone/Acetaminophen (Norco 5-325 Tablet) 1 Each Tablet, 1 EACH PO, TAB


   12/14/18


Omeprazole* (Omeprazole*) 20 Mg Capsule.dr, 20 MG PO DAILY, #30 CAP


   7/20/18


Losartan Potassium* (Losartan Potassium*) 25 Mg Tablet, 25 MG PO DAILY, TAB


   7/20/18





Allergies


Allergies:  


Coded Allergies:  


     No Known Drug Allergy (Unverified  Allergy, Mild, 6/11/19)





PMhx/Soc


History of Surgery:  Yes (C SECTION, GALLBLADDER, HERNIA SURGERY, TUBAL LIGATI


ON)


Anesthesia Reaction:  No


Hx Neurological Disorder:  No


Hx Respiratory Disorders:  No


Hx Cardiac Disorders:  Yes (HTN)


Hx Psychiatric Problems:  No


Hx Miscellaneous Medical Probl:  Yes (BILIARY PAPILLARY STENOSIS)


Hx Alcohol Use:  No


Hx Substance Use:  No


Hx Tobacco Use:  No


Smoking Status:  Never smoker





Physical Exam


Vitals





Vital Signs


  Date      Temp   Pulse  Resp  B/P (MAP)   Pulse Ox  O2         O2 Flow    FiO2


Time                                                  Delivery   Rate


   6/11/19   98.0     85    16      120/77        99  Room Air


     21:52                            (91)


   6/11/19  100.0     94    20      120/65        95  Room Air


     18:02                            (83)


   6/11/19  102.9


     16:59


   6/11/19  102.9    105    18      135/75        99


     16:09                            (95)





Physical Exam


Const:   Lethargic and shaking in bed.


Head:   Atraumatic 


Eyes:    Normal Conjunctiva


ENT:    Normal External Ears, Nose and Mouth.


Neck:               Full range of motion. No meningismus.


Resp:   Clear to auscultation bilaterally


Cardio:   Regular rate and rhythm, no murmurs


Abd:    Soft, Diffuse tenderness across abdomen, non distended. Normal bowel 


sounds


Skin:   No petechiae or rashes


Back:   No midline or flank tenderness


Ext:    No cyanosis, or edema


Neur:   Awake and alert


Psych:    Normal Mood and Affect


Result Diagram:  


6/11/19 1708                                                                    


           6/11/19 1708





Results 24 hrs





Laboratory Tests


Test
                6/11/19
17:08  6/11/19
17:52  6/11/19
19:18   6/11/19
19:20


White Blood Count    17.0 10^3/ul


Red Blood Count      4.07 10^6/ul


Hemoglobin              11.5 g/dl


Hematocrit                 33.8 %


Mean Corpuscular          83.0 fl


Volume


Mean Corpuscular          28.3 pg


Hemoglobin


Mean Corpuscular       34.0 g/dl 
  
              
              



Hemoglobin
Concen


t


Red Cell                   13.5 %


Distribution


Width


Platelet Count        235 10^3/UL


Mean Platelet             10.2 fl


Volume


Immature                  0.800 %


Granulocytes %


Neutrophils %              90.5 %


Lymphocytes %               5.1 %


Monocytes %                 3.3 %


Eosinophils %               0.1 %


Basophils %                 0.2 %


Nucleated Red         0.0 /100WBC


Blood Cells %


Immature            0.130 10^3/ul


Granulocytes #


Neutrophils #        15.4 10^3/ul


Lymphocytes #         0.9 10^3/ul


Monocytes #           0.6 10^3/ul


Eosinophils #         0.0 10^3/ul


Basophils #           0.0 10^3/ul


Nucleated Red         0.0 10^3/ul


Blood Cells #


Urine Color        GAUTAM


Urine Clarity      CLEAR


Urine pH                      7.0


Urine Specific              1.027


Gravity


Urine Ketones      TRACE mg/dL


Urine Nitrite      NEGATIVE mg/dL


Urine Bilirubin          2+ mg/dL


Urine                    2+ mg/dL


Urobilinogen


Urine Leukocyte    NEGATIVE
Delaney/ul  
              
              



Esterase



Urine Microscopic          5 /HPF


RBC


Urine Microscopic          5 /HPF


WBC


Urine Squamous     FEW /HPF 
       
              
              



Epithelial
Cells


Urine Hemoglobin   NEGATIVE mg/dL


Urine Glucose      NEGATIVE mg/dL


Urine Total              2+ mg/dl


Protein


Sodium Level           138 mmol/L


Potassium Level        3.1 mmol/L


Chloride Level         106 mmol/L


Carbon Dioxide          24 mmol/L


Level


Anion Gap                       8


Blood Urea               10 mg/dl


Nitrogen


Creatinine             0.69 mg/dl


Est Glomerular     > 60 mL/min 
    
              
              



Filtrat


Rate
mL/min


Glucose Level           135 mg/dl


Calcium Level           8.9 mg/dl


Total Bilirubin         1.0 mg/dl


Direct Bilirubin       0.00 mg/dl


Indirect                1.0 mg/dl


Bilirubin


Aspartate Amino          91 IU/L 
  
              
              



Transf
(AST/SGOT)


Alanine                 129 IU/L 
  
              
              



Aminotransferase



(ALT/SGPT)


Alkaline                 173 IU/L


Phosphatase


Total Protein            7.0 g/dl


Albumin                  3.6 g/dl


Globulin                3.40 g/dl


Albumin/Globulin             1.05


Ratio


POC Beta HCG,      NEGATIVE


Qualitative


POC Venous                            1.0 mmol/L


Lactate


Lactic Acid Level                                    1.0 mmol/L


Prothrombin Time                                                       17.6 Sec


Prothrombin Time                                                            1.4


Ratio


INR International  
                
              
                      1.44 



Normalized
Ratio


Activated          
                
              
                  33.7 Sec 



Partial
Thrombopl


ast Time


Troponin I                                                        < 0.012 ng/ml


Lipase                                                                   23 U/L





Current Medications


 Medications
   Dose
          Sig/Edmund
       Start Time
   Status  Last


 (Trade)       Ordered        Route
 PRN     Stop Time              Admin
Dose


                              Reason                                Admin


                1,000 mg       ONCE  STAT
    6/11/19       DC           6/11/19


Acetaminophen                 PO
            16:25
                       16:59




  (Tylenol                                  6/11/19 16:37


Tab)


 Sodium         1,000 ml @ 
   Q1H ONCE
      6/11/19       DC           6/11/19


Chloride       1,000 mls/hr   IV
            16:30
                       16:59



                                             6/11/19 17:29


 Sodium         2,470 ml       BOLUS OVER 2   6/11/19       DC           6/11/19


Chloride
                     HOURS STAT
    17:27
                       17:39



(NS)                          IV*
           6/11/19 17:30


 Morphine       4 mg           ONCE  STAT
    6/11/19       DC           6/11/19


Sulfate
                      IV
            17:39
                       17:45



(morphine)                                   6/11/19 17:41


 Ondansetron    4 mg           ONCE  STAT
    6/11/19       DC           6/11/19


HCl
  (Zofran                 IV
            17:39
                       17:45



Inj)                                         6/11/19 17:41


 Potassium      40 meq         ONCE  STAT
    6/11/19       DC           6/11/19


Chloride
                     PO
            18:18
                       18:44



(Klor-Con 20)                                6/11/19 18:20








Procedures/MDM


Patient was seen and evaluated for fever and chills. Gallbladder ultrasound not 


required due to history of cholecystectomy. Liver ultrasound is grossly 


unremarkable. CBC revealed leukocytosis which was followed by sepsis workup and 


CT abdomen/pelvis without contrast after speaking with Dr. Cast. Patient 


was started on IV fluids with morphine and zofran and tylenol. Labs revealed low


potassium which was followed by oral potassium (K-Dur). CXR revealed linear left


basilar airspace opacity likely representing atelectasis. CT abdominal/pelvis 


unremarkable. 





Patient is experiencing post-op fever with coincidental atelectasis. Patient's 


sepsis workup is negative. Patient is stable and ready for discharge. Patient 


will go home with Azithromycin 500mg PO QD x 3D and Tylenol. Patient was advised


about oral hydration.





Departure


Diagnosis:  


   Primary Impression:  


   Fever


   Fever type:  post-procedural  Qualified Codes:  R50.82 - Postprocedural fever


   Additional Impression:  


   Atelectasis


Condition:  Stable


Patient Instructions:  Fever Control (Adult)


Referrals:  


[facility]





Additional Instructions:  


Paciente aconseja volver a Departamento de urgencias inmediatamente para snt


omas nuevos o que empeoran . Paciente aconseja posteriores con el PCP en 2-3 


lerner . Paciente verbaliza la comprehensin y est de acuerdo con el tratamiento 


y el curso de accin.














Si el paciente no tiene ninguna de atencin primaria pueden seguir con





























[facility]





[id number] [place] [address]














o














Merged with [facility] + [address] [address]











GURINDER COLE PA-C               Jun 11, 2019 16:42